# Patient Record
Sex: FEMALE | Employment: OTHER | URBAN - METROPOLITAN AREA
[De-identification: names, ages, dates, MRNs, and addresses within clinical notes are randomized per-mention and may not be internally consistent; named-entity substitution may affect disease eponyms.]

---

## 2018-12-14 ENCOUNTER — OFFICE VISIT (OUTPATIENT)
Dept: NEUROLOGY | Age: 73
End: 2018-12-14

## 2018-12-14 VITALS
WEIGHT: 127.2 LBS | BODY MASS INDEX: 21.19 KG/M2 | SYSTOLIC BLOOD PRESSURE: 122 MMHG | DIASTOLIC BLOOD PRESSURE: 70 MMHG | RESPIRATION RATE: 16 BRPM | OXYGEN SATURATION: 96 % | HEIGHT: 65 IN | HEART RATE: 68 BPM

## 2018-12-14 DIAGNOSIS — D32.9 MENINGIOMA (HCC): Primary | ICD-10-CM

## 2018-12-14 DIAGNOSIS — R41.3 MEMORY LOSS: ICD-10-CM

## 2018-12-14 RX ORDER — ZOLPIDEM TARTRATE 5 MG/1
TABLET ORAL
COMMUNITY

## 2018-12-14 RX ORDER — LEVOTHYROXINE SODIUM 100 UG/1
100 TABLET ORAL
COMMUNITY

## 2018-12-14 NOTE — PROGRESS NOTES
Ms. Jaiden Greene presents for evaluation of memory loss. She is a former patient of Dr. Yolanda Lesches at [facility]. Her memory has been declining over the past four to five years. Depression screening done on this patient.

## 2018-12-14 NOTE — PATIENT INSTRUCTIONS
A Healthy Lifestyle: Care Instructions  Your Care Instructions    A healthy lifestyle can help you feel good, stay at a healthy weight, and have plenty of energy for both work and play. A healthy lifestyle is something you can share with your whole family. A healthy lifestyle also can lower your risk for serious health problems, such as high blood pressure, heart disease, and diabetes. You can follow a few steps listed below to improve your health and the health of your family. Follow-up care is a key part of your treatment and safety. Be sure to make and go to all appointments, and call your doctor if you are having problems. It's also a good idea to know your test results and keep a list of the medicines you take. How can you care for yourself at home? · Do not eat too much sugar, fat, or fast foods. You can still have dessert and treats now and then. The goal is moderation. · Start small to improve your eating habits. Pay attention to portion sizes, drink less juice and soda pop, and eat more fruits and vegetables. ? Eat a healthy amount of food. A 3-ounce serving of meat, for example, is about the size of a deck of cards. Fill the rest of your plate with vegetables and whole grains. ? Limit the amount of soda and sports drinks you have every day. Drink more water when you are thirsty. ? Eat at least 5 servings of fruits and vegetables every day. It may seem like a lot, but it is not hard to reach this goal. A serving or helping is 1 piece of fruit, 1 cup of vegetables, or 2 cups of leafy, raw vegetables. Have an apple or some carrot sticks as an afternoon snack instead of a candy bar. Try to have fruits and/or vegetables at every meal.  · Make exercise part of your daily routine. You may want to start with simple activities, such as walking, bicycling, or slow swimming. Try to be active 30 to 60 minutes every day. You do not need to do all 30 to 60 minutes all at once.  For example, you can exercise 3 times a day for 10 or 20 minutes. Moderate exercise is safe for most people, but it is always a good idea to talk to your doctor before starting an exercise program.  · Keep moving. Devon Buddle the lawn, work in the garden, or [employer]. Take the stairs instead of the elevator at work. · If you smoke, quit. People who smoke have an increased risk for heart attack, stroke, cancer, and other lung illnesses. Quitting is hard, but there are ways to boost your chance of quitting tobacco for good. ? Use nicotine gum, patches, or lozenges. ? Ask your doctor about stop-smoking programs and medicines. ? Keep trying. In addition to reducing your risk of diseases in the future, you will notice some benefits soon after you stop using tobacco. If you have shortness of breath or asthma symptoms, they will likely get better within a few weeks after you quit. · Limit how much alcohol you drink. Moderate amounts of alcohol (up to 2 drinks a day for men, 1 drink a day for women) are okay. But drinking too much can lead to liver problems, high blood pressure, and other health problems. Family health  If you have a family, there are many things you can do together to improve your health. · Eat meals together as a family as often as possible. · Eat healthy foods. This includes fruits, vegetables, lean meats and dairy, and whole grains. · Include your family in your fitness plan. Most people think of activities such as jogging or tennis as the way to fitness, but there are many ways you and your family can be more active. Anything that makes you breathe hard and gets your heart pumping is exercise. Here are some tips:  ? Walk to do errands or to take your child to school or the bus.  ? Go for a family bike ride after dinner instead of watching TV. Where can you learn more? Go to http://barney-kimberly.info/. Enter K630 in the search box to learn more about \"A Healthy Lifestyle: Care Instructions. \"  Current as of: December 7, 2017  Content Version: 11.8  © 8990-7504 [employer]wise, [employer]. Care instructions adapted under license by [employer] (which disclaims liability or warranty for this information). If you have questions about a medical condition or this instruction, always ask your healthcare professional. [address] any warranty or liability for your use of this information.

## 2018-12-14 NOTE — PROGRESS NOTES
Neurology Consult Note      HISTORY PROVIDED BY: patient    Chief Complaint:   Chief Complaint   Patient presents with    Memory Loss      Subjective:    Ceferino Killian is a 68 y.o. right handed female who presents in consultation for memory loss per pt, but referral from PCP mentions meningioma. On review of notes from [facility], she was last seen by me at [facility] on 4/1/16 in f/u for ST memory loss, initially presenting in August, 2012, found to have MCI on neuropsychological testing and no etiology seen on MRI brain, incidental meningioma seen 11/2014, stable on MRI 9/17/15. Exam was stable with MMSE score of 29/30, inverting the O and R when spelling WORLD backward, no signs of PD or parkinsonism, normal strength. Recommended continued monitoring for progression of memory loss. Repeat MRI brain Sept, 2017. She has not had an imaging study since her last visit with me. She was at the [facility] last November after a ankle injury and was told at that time she had mild cognitive impairment. She has noticed trouble with short term memory. Gives example of not remembering what she had for lunch by dinner time. She does not drive. She suffered a retinal detachment this November, but had trouble prior to that with retinal detachment in other eye, macular hole, and cataracts at age 45yo. Now legally blind in right eye.      Past Medical History:   Diagnosis Date    Basal cell carcinoma (BCC) of canthus of left eye     Bundle branch block, left     Detached retina, bilateral     History of hypertension     Hypothyroidism     Insomnia     Legally blind in right eye, as defined in [address] [facility] hole of right eye     OA (osteoarthritis)     Vitamin D deficiency       Past Surgical History:   Procedure Laterality Date    CARDIAC SURG PROCEDURE UNLIST      HX CATARACT REMOVAL Bilateral     HX KNEE REPLACEMENT Right     HX MALIGNANT SKIN LESION EXCISION        Social History     Socioeconomic History    Marital status:      Spouse name: Not on file    Number of children: Not on file    Years of education: Not on file    Highest education level: Not on file   Social Needs    Financial resource strain: Not on file    Food insecurity - worry: Not on file    Food insecurity - inability: Not on file    Transportation needs - medical: Not on file   Correlix needs - non-medical: Not on file   Occupational History    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[employer]   Tobacco Use    Smoking status: Former Smoker     Packs/day: 1.50     Years: 30.00     Pack years: 45.00     Last attempt to quit:      Years since quittin.0    Smokeless tobacco: Never Used   Substance and Sexual Activity    Alcohol use: No     Frequency: Never    Drug use: No    Sexual activity: Not on file   Other Topics Concern    Not on file   Social History Narrative    , lives with  in [facility]     Family History   Problem Relation Age of Onset    Parkinsonism Mother     Dementia Father     Kidney Disease Brother          Objective:   Review of Systems   Constitutional: Positive for malaise/fatigue. HENT: Negative. Eyes: Negative. Respiratory: Negative. Snoring   Cardiovascular: Negative. Gastrointestinal: Negative. Genitourinary: Negative. Musculoskeletal: Positive for falls and joint pain. Skin: Negative. Neurological: Negative. Endo/Heme/Allergies: Negative. Psychiatric/Behavioral: Positive for memory loss. Allergies   Allergen Reactions    Neomycin Rash        Meds:  Outpatient Medications Prior to Visit   Medication Sig Dispense Refill    levothyroxine (SYNTHROID) 100 mcg tablet Take 100 mcg by mouth Daily (before breakfast).  zolpidem (AMBIEN) 5 mg tablet Take  by mouth nightly as needed for Sleep. No facility-administered medications prior to visit.         Imaging:  MRI Results (most recent):  No results found for this or any previous visit.   CT Results (most recent):  No results found for this or any previous visit. Reviewed records in [employer] and media tab today    Lab Review   No results found for this or any previous visit. Exam:  Visit Vitals  /70   Pulse 68   Resp 16   Ht 5' 5\" (1.651 m)   Wt 57.7 kg (127 lb 3.2 oz)   SpO2 96%   BMI 21.17 kg/m²     General:  Alert, cooperative, no distress. Head:  Normocephalic, without obvious abnormality, atraumatic. Respiratory:  Heart:   Non labored breathing  Regular rate and rhythm, no murmurs   Neck:   2+ carotids, no bruits   Extremities: Warm, no cyanosis or edema. Pulses: 2+ radial pulses. Neurologic:  MS: Alert, speech intact. Language -see MMSE. Attention and fund of knowledge appropriate.     Cranial Nerves:  II: visual fields Full to confrontation in left eye   II: pupils Both irregular with evidence of prior cataracts   II: optic disc    III,VII: ptosis none   III,IV,VI: extraocular muscles  EOMI, no nystagmus or diplopia   V: facial light touch sensation  normal   VII: facial muscle function   symmetric   VIII: hearing intact   IX: soft palate elevation  normal   XI: trapezius strength  5/5   XI: sternocleidomastoid strength 5/5   XII: tongue  Midline     Motor: normal bulk and tone, no tremor              Strength: 5/5 throughout, no PD  Sensory: intact to LT, PP except left foot since surgery one year ago, mild dec vibration in right great toe  Coordination: FTN and HTS intact, JJ intact,Romberg - unable to stand with feet together with eyes open  Gait: Unsteady wide based gait, tandem walking not attempted  Reflexes: 2+ symmetric, toe downgoing on right, ?up on left    Mini Mental State Exam 12/14/2018   What is the Year 1   What is the Season 1   What is the Date 1   What is the Day 1   What is the Month 1   Where are we State 1   Where are we Country 1   Where are we [place] or [facility] 1   Where are we Floor 1   Name three objects, then ask the patient to say them 3   Serial sevens Subtract 7 from 100 in increments 5   Ask for the three objects repeated above 3   Name a pencil 1   Name a watch 1   Have the patient repeat this phrase \"No ifs, ands, or buts\" 1   Three stage command: Take the paper in your right hand 1   Fold the paper in half 1   Put the paper on the floor 1   Read and obey the followin [facility] 1   Have the patient write a sentence 1   Have the patient copy a figure 1   Mini Mental Score 30          Assessment/Plan   Pt is a 68 y.o. right handed female with c/o ST memory loss, initially presenting in  and found to have MCI on neuropsychological testing and no etiology seen on MRI brain, but incidental meningioma seen 2014, stable on MRI 9/17/15. Exam with MMSE score of 30/30, intact to LT, PP except left foot since surgery one year ago, mild dec vibration in right great toe, Romberg - unable to stand with feet together with eyes open, unsteady wide based gait, tandem walking not attempted, toe ?up on left. Cognitive function/memory appears stable. Recommend repeat MRI brain w/wo contrast to reassess meningioma. F/u in 3 months, instructed to call in the interim if needed. ICD-10-CM ICD-9-CM    1. Meningioma (HCC) D32.9 225.2 MRI BRAIN W WO CONT   2. Memory loss R41.3 780.93 MRI BRAIN W WO CONT       Signed:   Jose Luis Argueta MD  2018

## 2019-01-03 ENCOUNTER — TELEPHONE (OUTPATIENT)
Dept: NEUROLOGY | Age: 74
End: 2019-01-03

## 2019-01-03 NOTE — TELEPHONE ENCOUNTER
Pt calling, stated she can't have her MRI because she has an implant. She said she is seeing heart dr to see if she can remove the implant.  Please call back

## 2019-01-03 NOTE — TELEPHONE ENCOUNTER
Lucinda Cutler - Please ask pt if this is a loop monitor. The MRI is not urgent if she needs to keep the monitor on. How long did they need her to wear it. If this device is a PM or defibrillator, then obviously it cannot be removed.  See if you can get notes from Cardiologist.

## 2019-01-15 ENCOUNTER — TELEPHONE (OUTPATIENT)
Dept: NEUROLOGY | Age: 74
End: 2019-01-15

## 2019-01-15 NOTE — TELEPHONE ENCOUNTER
----- Message from Jose Luismartha Boo sent at 1/15/2019 12:13 PM EST -----  Regarding: Dr. Andrea Blake  Pt would like to know if an auth from [facility] has been approved yet. She also stated she will be bringing her CD from her pervious mri to be compared. [phone].

## 2019-01-18 ENCOUNTER — TELEPHONE (OUTPATIENT)
Dept: NEUROLOGY | Age: 74
End: 2019-01-18

## 2019-01-18 NOTE — TELEPHONE ENCOUNTER
Patient stated she has authorization for MRI. I spoke with our  and she stated the Coordination of Care team also takes care of the insurance referral, if needed. I informed patient and gave her Coordination of Care contact information.

## 2019-01-18 NOTE — TELEPHONE ENCOUNTER
I called Coordination of Care and advised them to look into approval for patient's MRI. They agree to do this.

## 2019-01-18 NOTE — TELEPHONE ENCOUNTER
----- Message from Shanae Pastor sent at 1/18/2019  2:15 PM EST -----  Regarding: Dr. Billy Hoang with [facility] with [facility] is requesting a call back with an authorization for the patient's MRI. Heath Iverson stated that the patient's MRI is 1/26/19 and the authorization needs to be expedited.   (c)[phone]

## 2019-01-26 ENCOUNTER — HOSPITAL ENCOUNTER (OUTPATIENT)
Dept: MRI IMAGING | Age: 74
Discharge: HOME | End: 2019-01-26
Attending: PSYCHIATRY & NEUROLOGY

## 2019-01-26 DIAGNOSIS — R41.3 MEMORY LOSS: ICD-10-CM

## 2019-01-26 DIAGNOSIS — D32.9 MENINGIOMA (HCC): ICD-10-CM

## 2019-02-01 ENCOUNTER — TELEPHONE (OUTPATIENT)
Dept: NEUROLOGY | Age: 74
End: 2019-02-01

## 2019-02-01 NOTE — TELEPHONE ENCOUNTER
Pt calling wondering if the MRI order is still good, pt wants to get the MRI done in Feb. Please call back

## 2019-03-02 ENCOUNTER — HOSPITAL ENCOUNTER (OUTPATIENT)
Dept: MRI IMAGING | Age: 74
Discharge: HOME | End: 2019-03-02
Attending: PSYCHIATRY & NEUROLOGY
Payer: MEDICARE

## 2019-03-02 PROCEDURE — A9575 INJ GADOTERATE MEGLUMI 0.1ML: HCPCS | Performed by: PSYCHIATRY & NEUROLOGY

## 2019-03-02 PROCEDURE — 70553 MRI BRAIN STEM W/O & W/DYE: CPT

## 2019-03-02 PROCEDURE — 74011250636 HC RX REV CODE- 250/636: Performed by: PSYCHIATRY & NEUROLOGY

## 2019-03-02 RX ORDER — GADOTERATE MEGLUMINE 376.9 MG/ML
10 INJECTION INTRAVENOUS
Status: COMPLETED | OUTPATIENT
Start: 2019-03-02 | End: 2019-03-02

## 2019-03-02 RX ADMIN — GADOTERATE MEGLUMINE 10 ML: 376.9 INJECTION INTRAVENOUS at 11:16

## 2019-03-05 NOTE — PROGRESS NOTES
I will review results with pt at f/u appt next week. Shelia Jason - Please request a copy, or have pt obtain a copy and bring it to us, of previous MRI at [facility] on CD in order for our radiologist to make a comparison.

## 2019-03-14 ENCOUNTER — OFFICE VISIT (OUTPATIENT)
Dept: NEUROLOGY | Age: 74
End: 2019-03-14

## 2019-03-14 VITALS
DIASTOLIC BLOOD PRESSURE: 70 MMHG | WEIGHT: 131.2 LBS | OXYGEN SATURATION: 99 % | SYSTOLIC BLOOD PRESSURE: 134 MMHG | HEART RATE: 53 BPM | BODY MASS INDEX: 21.86 KG/M2 | RESPIRATION RATE: 16 BRPM | HEIGHT: 65 IN

## 2019-03-14 DIAGNOSIS — R41.3 MEMORY LOSS: ICD-10-CM

## 2019-03-14 DIAGNOSIS — G44.40 REBOUND HEADACHE: ICD-10-CM

## 2019-03-14 DIAGNOSIS — D32.9 MENINGIOMA (HCC): Primary | ICD-10-CM

## 2019-03-14 NOTE — PROGRESS NOTES
Neurology Consult Note      HISTORY PROVIDED BY: patient    Chief Complaint:   Chief Complaint   Patient presents with    Memory Loss    Neurologic Problem      Subjective:   Pt is a 68 y.o. right handed female llast seen in clinic on 12/14/18 with c/o ST memory loss, initially presenting in August, 2012 and found to have MCI on neuropsychological testing and no etiology seen on MRI brain, but incidental meningioma seen 11/2014, stable on MRI 9/17/15. Exam with MMSE score of 30/30, intact to LT, PP except left foot since surgery one year ago, mild dec vibration in right great toe, Romberg - unable to stand with feet together with eyes open, unsteady wide based gait, tandem walking not attempted, toe ?up on left. Cognitive function/memory appears stable. Recommended repeat MRI brain w/wo contrast to reassess meningioma. She returns for f/u. They feel her memory is stable. MRI brain w/wo contrast 3/2/19 with small right anterior clinoid meningioma, incidental right parotid nodule, possibly LN or parotid neoplasm per radiology report. She reports severe global HAs, but mostly right sided. One time felt like an arrow through head, but typically somewhere between dull and sharp. Waking with HA every day for a couple of months, taking tylenol daily. Only occ HAs in past, like this one, only this one is not responding to tylenol. Had vomiting only once last night after a coughing spell. No N/V. Currently undergoing tx for left retinal detachment. She is sleeping well, bedtime at 11PM with Ambien and her  has to wake her up around 10AM.  Does snore and wakes herself gasping for air when supine, but mostly sleeps on right side, no apneas.      Past Medical History:   Diagnosis Date    Basal cell carcinoma (BCC) of canthus of left eye     Bundle branch block, left     Detached retina, bilateral     History of hypertension     Hypothyroidism     Insomnia     Legally blind in right eye, as defined in [place]     Macular hole of right eye     Meningioma ([address]     right anterior clinoid meningioma    OA (osteoarthritis)     Vitamin D deficiency       Past Surgical History:   Procedure Laterality Date    CARDIAC SURG PROCEDURE UNLIST      HX CATARACT REMOVAL Bilateral     HX KNEE REPLACEMENT Right     HX MALIGNANT SKIN LESION EXCISION        Social History     Socioeconomic History    Marital status:      Spouse name: Not on file    Number of children: Not on file    Years of education: Not on file    Highest education level: Not on file   Social Needs    Financial resource strain: Not on file    Food insecurity - worry: Not on file    Food insecurity - inability: Not on file   Tapad needs - medical: Not on file   Tapad needs - non-medical: Not on file   Occupational History    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[employer]   Tobacco Use    Smoking status: Former Smoker     Packs/day: 1.50     Years: 30.00     Pack years: 45.00     Last attempt to quit:      Years since quittin.2    Smokeless tobacco: Never Used   Substance and Sexual Activity    Alcohol use: No     Frequency: Never    Drug use: No    Sexual activity: Not on file   Other Topics Concern    Not on file   Social History Narrative    , lives with  in [facility]     Family History   Problem Relation Age of Onset    Parkinsonism Mother     Dementia Father     Kidney Disease Brother          Objective:   ROS: Per HPI o/w neg    Allergies   Allergen Reactions    Neomycin Rash        Meds:  Outpatient Medications Prior to Visit   Medication Sig Dispense Refill    levothyroxine (SYNTHROID) 100 mcg tablet Take 100 mcg by mouth Daily (before breakfast).  zolpidem (AMBIEN) 5 mg tablet Take  by mouth nightly as needed for Sleep. No facility-administered medications prior to visit.         Imaging:  MRI Results (most recent):  Results from [facility] Encounter encounter on 01/26/19   MRI BRAIN W WO CONT    Narrative INDICATION:  Pt with h/o meningioma, reassess. She will bring CD from [facility] with  previous MRI's for comparison     COMPARISON:  None    TECHNIQUE:  Multiplanar multisequence acquisition without and with contrast of  the brain. FINDINGS:       Ventricles:  Midline, no hydrocephalus. Brain Parenchyma/Brainstem:  Mild chronic T2 hyperintensities in the  supratentorial white matter. No acute infarction. Intracranial Hemorrhage:  None. Basal Cisterns:  Normal.   Flow Voids:  Normal.  Post Contrast:  Small, enhancing extra-axial focus along the right anterior  clinoid process which measures 0.7 x 0.6 x 0.5 cm. Additional Comments: Abundant inner table ossification particularly in the  frontal and temporal regions, as well as in the cerebellopontine angles. Incidentally, there is a round enhancing nodule within the ventral aspect of the  right parotid gland at the junction of the superficial and deep lobes which  measures 0.8 x 0.7 x 1.1 cm. Impression IMPRESSION:    1. Small right anterior clinoid meningioma. If prior examination becomes  available, addendum will be generated. 2. No acute intracranial abnormality. 3. Incidental right parotid nodule could be a lymph node or parotid neoplasm. Again correlation with any prior imaging be helpful to document stability,  otherwise could be assessed with dedicated ultrasound. CT Results (most recent):  No results found for this or any previous visit. Reviewed records in [employer] [facility] [employer] tab today    Lab Review   No results found for this or any previous visit. Exam:  Visit Vitals  /70   Pulse (!) 53   Resp 16   Ht 5' 5\" (1.651 m)   Wt 59.5 kg (131 lb 3.2 oz)   SpO2 99%   BMI 21.83 kg/m²     General:  Alert, cooperative, no distress. Head:  Normocephalic, without obvious abnormality, atraumatic.    Respiratory:  Heart:   Non labored breathing  Regular rate and rhythm, no murmurs   Neck: Extremities: Warm, no cyanosis or edema. Pulses: 2+ radial pulses. Neurologic:  MS: Alert, speech intact. Language -see MMSE. Attention and fund of knowledge appropriate.     Cranial Nerves:  II: visual fields Full to confrontation in left eye   II: pupils Both irregular with evidence of prior cataracts   II: optic disc    III,VII: ptosis none   III,IV,VI: extraocular muscles  EOMI, no nystagmus or diplopia   V: facial light touch sensation     VII: facial muscle function   symmetric   VIII: hearing intact   IX: soft palate elevation  normal   XI: trapezius strength     XI: sternocleidomastoid strength    XII: tongue  Midline     Motor: normal bulk and tone, no tremor              Strength: 5/5 throughout, no PD  Sensory: (At 12/14/18 OV: intact to LT, PP except left foot since surgery one year ago, mild dec vibration in right great toe)  Coordination: (At 12/14/18 OV: FTN and HTS intact, JJ intact,Romberg - unable to stand with feet together with eyes open)  Gait: Wide based gait  Reflexes: 2+ symmetric, (At 12/14/18 OV: toe downgoing on right, ?up on left)    Mini Mental State Exam 12/14/2018   What is the Year 1   What is the Season 1   What is the Date 1   What is the Day 1   What is the Month 1   Where are we State 1   Where are we Country 1   Where are we [address] [place] [address] or [facility] 1   Where are we Floor 1   Name three objects, then ask the patient to say them 3   Serial sevens Subtract 7 from 100 in increments 5   Ask for the three objects repeated above 3   Name a pencil 1   Name a watch 1   Have the patient repeat this phrase \"No ifs, ands, or buts\" 1   Three stage command: Take the paper in your right hand 1   Fold the paper in half 1   Put the paper on the floor 1   Read and obey the following: CLOSE YOUR EYES 1   Have the patient write a sentence 1   Have the patient copy a figure 1   Mini Mental Score 30          Assessment/Plan   Pt is a 68 y.o. right handed female with c/o ST memory loss, initially presenting in August, 2012 and found to have MCI on neuropsychological testing and no etiology seen on MRI brain, but incidental meningioma seen 11/2014, stable on MRI w/wo contrast 3/2/19. New c/o daily HAs, taking daily tylenol. Exam 12/14/18 with MMSE score of 30/30, non-focal, wide based gait. Cognitive function/memory stable. Daily HAs likely due to rebound HAs from daily tylenol. If no improvement off of pain meds, consider sleep eval for ABI. F/u with PCP regarding incidental right parotid nodule, possibly LN or parotid neoplasm per radiology report. F/u in 2 years, instructed to call in the interim if needed. ICD-10-CM ICD-9-CM    1. Meningioma (HCC) D32.9 225.2    2. Memory loss R41.3 780.93    3. Rebound headache G44.40 339.3        Signed:   Aston Farah MD  3/14/2019

## 2019-03-14 NOTE — PROGRESS NOTES
Ms. Thomas Pelayo presents today to follow up meningioma and memory loss. Her spouse is present and he reports patient's symptoms are stable. Depression screening done at this time.

## 2019-03-14 NOTE — PATIENT INSTRUCTIONS
A Healthy Lifestyle: Care Instructions  Your Care Instructions    A healthy lifestyle can help you feel good, stay at a healthy weight, and have plenty of energy for both work and play. A healthy lifestyle is something you can share with your whole family. A healthy lifestyle also can lower your risk for serious health problems, such as high blood pressure, heart disease, and diabetes. You can follow a few steps listed below to improve your health and the health of your family. Follow-up care is a key part of your treatment and safety. Be sure to make and go to all appointments, and call your doctor if you are having problems. It's also a good idea to know your test results and keep a list of the medicines you take. How can you care for yourself at home? · Do not eat too much sugar, fat, or fast foods. You can still have dessert and treats now and then. The goal is moderation. · Start small to improve your eating habits. Pay attention to portion sizes, drink less juice and soda pop, and eat more fruits and vegetables. ? Eat a healthy amount of food. A 3-ounce serving of meat, for example, is about the size of a deck of cards. Fill the rest of your plate with vegetables and whole grains. ? Limit the amount of soda and sports drinks you have every day. Drink more water when you are thirsty. ? Eat at least 5 servings of fruits and vegetables every day. It may seem like a lot, but it is not hard to reach this goal. A serving or helping is 1 piece of fruit, 1 cup of vegetables, or 2 cups of leafy, raw vegetables. Have an apple or some carrot sticks as an afternoon snack instead of a candy bar. Try to have fruits and/or vegetables at every meal.  · Make exercise part of your daily routine. You may want to start with simple activities, such as walking, bicycling, or slow swimming. Try to be active 30 to 60 minutes every day. You do not need to do all 30 to 60 minutes all at once.  For example, you can exercise 3 times a day for 10 or 20 minutes. Moderate exercise is safe for most people, but it is always a good idea to talk to your doctor before starting an exercise program.  · Keep moving. [place] Bun the lawn, work in the garden, or [employer]. Take the stairs instead of the elevator at work. · If you smoke, quit. People who smoke have an increased risk for heart attack, stroke, cancer, and other lung illnesses. Quitting is hard, but there are ways to boost your chance of quitting tobacco for good. ? Use nicotine gum, patches, or lozenges. ? Ask your doctor about stop-smoking programs and medicines. ? Keep trying. In addition to reducing your risk of diseases in the future, you will notice some benefits soon after you stop using tobacco. If you have shortness of breath or asthma symptoms, they will likely get better within a few weeks after you quit. · Limit how much alcohol you drink. Moderate amounts of alcohol (up to 2 drinks a day for men, 1 drink a day for women) are okay. But drinking too much can lead to liver problems, high blood pressure, and other health problems. Family health  If you have a family, there are many things you can do together to improve your health. · Eat meals together as a family as often as possible. · Eat healthy foods. This includes fruits, vegetables, lean meats and dairy, and whole grains. · Include your family in your fitness plan. Most people think of activities such as jogging or tennis as the way to fitness, but there are many ways you and your family can be more active. Anything that makes you breathe hard and gets your heart pumping is exercise. Here are some tips:  ? Walk to do errands or to take your child to school or the bus.  ? Go for a family bike ride after dinner instead of watching TV. Where can you learn more? Go to http://barney-kimberly.info/. Enter S397 in the search box to learn more about \"A Healthy Lifestyle: Care Instructions. \"  Current as of: September 11, 2018  Content Version: 11.9  © 0644-4984 [employer]. Care instructions adapted under license by [employer] (which disclaims liability or warranty for this information). If you have questions about a medical condition or this instruction, always ask your healthcare professional. [address] any warranty or liability for your use of this information.

## 2019-03-14 NOTE — LETTER
4/8/19 Patient: Lynn Rapp YOB: 1945 Date of Visit: 3/14/2019 Juliana Lynn MD 
[address] [place] [address] [phone] VIA Facsimile: [phone] Dear Juliana Lynn MD, Thank you for referring Ms. Lynn Rapp to [address] [place] [address] for evaluation. My notes for this consultation are attached. If you have questions, please do not hesitate to call me. I look forward to following your patient along with you. Sincerely, Lidia Sanders MD